# Patient Record
Sex: MALE | Race: OTHER | HISPANIC OR LATINO | URBAN - METROPOLITAN AREA
[De-identification: names, ages, dates, MRNs, and addresses within clinical notes are randomized per-mention and may not be internally consistent; named-entity substitution may affect disease eponyms.]

---

## 2021-04-21 ENCOUNTER — OFFICE VISIT (OUTPATIENT)
Dept: URBAN - METROPOLITAN AREA CLINIC 60 | Facility: CLINIC | Age: 61
End: 2021-04-21

## 2021-07-01 ENCOUNTER — OFFICE VISIT (OUTPATIENT)
Dept: URBAN - METROPOLITAN AREA SURGERY CENTER 4 | Facility: SURGERY CENTER | Age: 61
End: 2021-07-01

## 2021-07-08 LAB — PATHOLOGY (INDENTED REPORT): (no result)

## 2021-07-14 ENCOUNTER — OFFICE VISIT (OUTPATIENT)
Dept: URBAN - METROPOLITAN AREA CLINIC 60 | Facility: CLINIC | Age: 61
End: 2021-07-14

## 2022-07-09 ENCOUNTER — TELEPHONE ENCOUNTER (OUTPATIENT)
Dept: URBAN - METROPOLITAN AREA CLINIC 121 | Facility: CLINIC | Age: 62
End: 2022-07-09

## 2022-07-09 RX ORDER — EZETIMIBE 10 MG/1
TABLET ORAL
Refills: 0 | OUTPATIENT
Start: 2021-01-28 | End: 2021-07-14

## 2022-07-10 ENCOUNTER — TELEPHONE ENCOUNTER (OUTPATIENT)
Dept: URBAN - METROPOLITAN AREA CLINIC 121 | Facility: CLINIC | Age: 62
End: 2022-07-10

## 2022-07-10 RX ORDER — ROSUVASTATIN CALCIUM 20 MG/1
TABLET, FILM COATED ORAL
Refills: 0 | Status: ACTIVE | COMMUNITY
Start: 2021-04-12

## 2022-07-10 RX ORDER — METOPROLOL TARTRATE 25 MG/1
TABLET, FILM COATED ORAL
Refills: 0 | Status: ACTIVE | COMMUNITY
Start: 2021-04-12

## 2022-07-10 RX ORDER — ASPIRIN 81 MG/1
TABLET, DELAYED RELEASE ORAL
Refills: 0 | Status: ACTIVE | COMMUNITY
Start: 2021-04-21

## 2022-07-10 RX ORDER — MULTIVIT-MIN/FA/LYCOPEN/LUTEIN .4-300-25
TABLET ORAL
Refills: 0 | Status: ACTIVE | COMMUNITY
Start: 2014-05-05

## 2022-07-10 RX ORDER — FAMOTIDINE 40 MG/1
TABLET, FILM COATED ORAL
Refills: 0 | Status: ACTIVE | COMMUNITY
Start: 2021-04-19

## 2022-09-30 ENCOUNTER — OFFICE VISIT (OUTPATIENT)
Dept: URBAN - METROPOLITAN AREA CLINIC 60 | Facility: CLINIC | Age: 62
End: 2022-09-30

## 2022-10-19 ENCOUNTER — OFFICE VISIT (OUTPATIENT)
Dept: URBAN - METROPOLITAN AREA CLINIC 60 | Facility: CLINIC | Age: 62
End: 2022-10-19

## 2022-10-19 RX ORDER — METOPROLOL TARTRATE 25 MG/1
TABLET, FILM COATED ORAL
Refills: 0 | Status: ACTIVE | COMMUNITY
Start: 2021-04-12

## 2022-10-19 RX ORDER — MULTIVIT-MIN/FA/LYCOPEN/LUTEIN .4-300-25
TABLET ORAL
Refills: 0 | Status: ACTIVE | COMMUNITY
Start: 2014-05-05

## 2022-10-19 RX ORDER — ROSUVASTATIN CALCIUM 20 MG/1
TABLET, FILM COATED ORAL
Refills: 0 | Status: ACTIVE | COMMUNITY
Start: 2021-04-12

## 2022-10-19 RX ORDER — FAMOTIDINE 40 MG/1
TABLET, FILM COATED ORAL
Refills: 0 | Status: ACTIVE | COMMUNITY
Start: 2021-04-19

## 2022-10-19 RX ORDER — ASPIRIN 81 MG/1
TABLET, DELAYED RELEASE ORAL
Refills: 0 | Status: ACTIVE | COMMUNITY
Start: 2021-04-21

## 2023-01-18 ENCOUNTER — WEB ENCOUNTER (OUTPATIENT)
Dept: URBAN - METROPOLITAN AREA CLINIC 63 | Facility: CLINIC | Age: 63
End: 2023-01-18

## 2023-01-18 ENCOUNTER — OFFICE VISIT (OUTPATIENT)
Dept: URBAN - METROPOLITAN AREA CLINIC 63 | Facility: CLINIC | Age: 63
End: 2023-01-18
Payer: MEDICARE

## 2023-01-18 VITALS
OXYGEN SATURATION: 96 % | HEIGHT: 70 IN | BODY MASS INDEX: 27.06 KG/M2 | SYSTOLIC BLOOD PRESSURE: 120 MMHG | WEIGHT: 189 LBS | DIASTOLIC BLOOD PRESSURE: 70 MMHG | HEART RATE: 61 BPM | TEMPERATURE: 97 F

## 2023-01-18 DIAGNOSIS — A04.8 H. PYLORI INFECTION: ICD-10-CM

## 2023-01-18 PROBLEM — 307759003 HELICOBACTER PYLORI GASTROINTESTINAL TRACT INFECTION: Status: ACTIVE | Noted: 2023-01-18

## 2023-01-18 PROCEDURE — 99213 OFFICE O/P EST LOW 20 MIN: CPT | Performed by: INTERNAL MEDICINE

## 2023-01-18 RX ORDER — METOPROLOL TARTRATE 25 MG/1
TABLET, FILM COATED ORAL
Refills: 0 | Status: ACTIVE | COMMUNITY
Start: 2021-04-12

## 2023-01-18 RX ORDER — ASPIRIN 81 MG/1
TABLET, DELAYED RELEASE ORAL
Refills: 0 | Status: ACTIVE | COMMUNITY
Start: 2021-04-21

## 2023-01-18 RX ORDER — FAMOTIDINE 40 MG/1
TABLET, FILM COATED ORAL
Refills: 0 | Status: ACTIVE | COMMUNITY
Start: 2021-04-19

## 2023-01-18 RX ORDER — ROSUVASTATIN CALCIUM 20 MG/1
TABLET, FILM COATED ORAL
Refills: 0 | Status: ACTIVE | COMMUNITY
Start: 2021-04-12

## 2023-01-18 RX ORDER — MULTIVIT-MIN/FA/LYCOPEN/LUTEIN .4-300-25
TABLET ORAL
Refills: 0 | Status: ACTIVE | COMMUNITY
Start: 2014-05-05

## 2023-01-18 NOTE — PHYSICAL EXAM GASTROINTESTINAL
Abdomen , soft, nontender, nondistended , no guarding or rigidity , no masses palpable , normal bowel sounds , Liver and Spleen,  no hepatosplenomegaly , liver nontender , Abdomen , soft, nontender, nondistended , no guarding or rigidity , no masses palpable , normal bowel sounds , Liver and Spleen,  no hepatosplenomegaly , liver nontender. Ventral hernia.

## 2023-01-18 NOTE — HPI-TODAY'S VISIT:
4/21 Patient is here today in good general state. He is here for his screening colonoscopy. Never had colonoscopy done denies any personal history of colon rectal cancer, rectal bleeding, or change in his bowel habits. Positive for father with colorectal cancer at age of 70s.  History of MI several years ago. Plan: We will do colonoscopy if we obtain cardiac clearance. 7/21 Patient colonoscopy shows evidence of a 10 mm tubular adenoma polyp into the sigmoid colon, and internal hemorrhoids.  Today patient is in good general state he had no GI complaints.  Next colonoscopy in 3 years 1/23: Patient  with episode of reflux and epigastric pain.  went to [facility]  had  cardiac w/u that was normal and had later an EGD  with evidence  of   H. pylori.  Patient had treatment with antibiotics, will need to check status of h pylori. Will follow in 2 months. If status  persist will need another treatment and if is indicated  will consider EGD and antibiogram.

## 2023-04-24 ENCOUNTER — OFFICE VISIT (OUTPATIENT)
Dept: URBAN - METROPOLITAN AREA CLINIC 63 | Facility: CLINIC | Age: 63
End: 2023-04-24

## 2023-04-26 ENCOUNTER — TELEPHONE ENCOUNTER (OUTPATIENT)
Dept: URBAN - METROPOLITAN AREA CLINIC 64 | Facility: CLINIC | Age: 63
End: 2023-04-26

## 2023-05-09 ENCOUNTER — OFFICE VISIT (OUTPATIENT)
Dept: URBAN - METROPOLITAN AREA CLINIC 63 | Facility: CLINIC | Age: 63
End: 2023-05-09

## 2023-05-10 ENCOUNTER — OFFICE VISIT (OUTPATIENT)
Dept: URBAN - METROPOLITAN AREA CLINIC 63 | Facility: CLINIC | Age: 63
End: 2023-05-10

## 2023-05-23 ENCOUNTER — OFFICE VISIT (OUTPATIENT)
Dept: URBAN - METROPOLITAN AREA CLINIC 63 | Facility: CLINIC | Age: 63
End: 2023-05-23
Payer: MEDICARE

## 2023-05-23 VITALS
HEART RATE: 67 BPM | OXYGEN SATURATION: 96 % | HEIGHT: 70 IN | TEMPERATURE: 97.3 F | WEIGHT: 190 LBS | SYSTOLIC BLOOD PRESSURE: 130 MMHG | BODY MASS INDEX: 27.2 KG/M2 | DIASTOLIC BLOOD PRESSURE: 80 MMHG

## 2023-05-23 DIAGNOSIS — Z80.9 FAMILY HISTORY OF MALIGNANT NEOPLASM, UNSPECIFIED: ICD-10-CM

## 2023-05-23 DIAGNOSIS — Z86.010 PERSONAL HISTORY OF COLONIC POLYPS: ICD-10-CM

## 2023-05-23 DIAGNOSIS — E78.5 HYPERLIPIDEMIA, UNSPECIFIED: ICD-10-CM

## 2023-05-23 PROBLEM — 428283002: Status: ACTIVE | Noted: 2023-05-23

## 2023-05-23 PROCEDURE — 99214 OFFICE O/P EST MOD 30 MIN: CPT | Performed by: INTERNAL MEDICINE

## 2023-05-23 RX ORDER — ROSUVASTATIN CALCIUM 20 MG/1
TABLET, FILM COATED ORAL
Refills: 0 | Status: ACTIVE | COMMUNITY
Start: 2021-04-12

## 2023-05-23 RX ORDER — MULTIVIT-MIN/FA/LYCOPEN/LUTEIN .4-300-25
TABLET ORAL
Refills: 0 | Status: ACTIVE | COMMUNITY
Start: 2014-05-05

## 2023-05-23 RX ORDER — ASPIRIN 81 MG/1
TABLET, DELAYED RELEASE ORAL
Refills: 0 | Status: ACTIVE | COMMUNITY
Start: 2021-04-21

## 2023-05-23 RX ORDER — FAMOTIDINE 40 MG/1
TABLET, FILM COATED ORAL
Refills: 0 | Status: ACTIVE | COMMUNITY
Start: 2021-04-19

## 2023-05-23 RX ORDER — METOPROLOL TARTRATE 25 MG/1
TABLET, FILM COATED ORAL
Refills: 0 | Status: ACTIVE | COMMUNITY
Start: 2021-04-12

## 2023-05-23 NOTE — HPI-TODAY'S VISIT:
4/21 Patient is here today in good general state. He is here for his screening colonoscopy. Never had colonoscopy done denies any personal history of colon rectal cancer, rectal bleeding, or change in his bowel habits. Positive for father with colorectal cancer at age of 70s.  History of MI several years ago. Plan: We will do colonoscopy if we obtain cardiac clearance. 7/21 Patient colonoscopy shows evidence of a 10 mm tubular adenoma polyp into the sigmoid colon, and internal hemorrhoids.  Today patient is in good general state he had no GI complaints.  Next colonoscopy in 3 years 1/23: Patient  with episode of reflux and epigastric pain.  [place] [facility]  [place]  [place] w/u that was normal and had later an EGD  with evidence  of   H. pylori.  Patient had treatment with antibiotics, will need to check status of h pylori. Will follow in 2 months. If status  persist will need another treatment and if is indicated  will consider EGD and antibiogram. 5/23: Patient with history of H. pylori.  Patient had treatment with antibiotics that responded well to treatment, with resolution of the infection.  Patient is doing well denies any GI complaints.  Plan will be to do surveillance colonoscopy  with strong family h/o colorectal cancer his father. patient will have his colonoscopy  this year. Patient with elevated TG (250) will continue diet and treatment statrted with Dr Gutierrez. Deedee braun.

## 2023-05-30 ENCOUNTER — LAB OUTSIDE AN ENCOUNTER (OUTPATIENT)
Dept: URBAN - METROPOLITAN AREA CLINIC 63 | Facility: CLINIC | Age: 63
End: 2023-05-30

## 2023-07-10 ENCOUNTER — TELEPHONE ENCOUNTER (OUTPATIENT)
Dept: URBAN - METROPOLITAN AREA CLINIC 63 | Facility: CLINIC | Age: 63
End: 2023-07-10

## 2023-07-21 ENCOUNTER — OFFICE VISIT (OUTPATIENT)
Dept: URBAN - METROPOLITAN AREA SURGERY CENTER 4 | Facility: SURGERY CENTER | Age: 63
End: 2023-07-21

## 2023-08-14 ENCOUNTER — TELEPHONE ENCOUNTER (OUTPATIENT)
Dept: URBAN - METROPOLITAN AREA CLINIC 63 | Facility: CLINIC | Age: 63
End: 2023-08-14

## 2023-08-23 ENCOUNTER — OFFICE VISIT (OUTPATIENT)
Dept: URBAN - METROPOLITAN AREA CLINIC 63 | Facility: CLINIC | Age: 63
End: 2023-08-23

## 2023-08-28 ENCOUNTER — CLAIMS CREATED FROM THE CLAIM WINDOW (OUTPATIENT)
Dept: URBAN - METROPOLITAN AREA SURGERY CENTER 4 | Facility: SURGERY CENTER | Age: 63
End: 2023-08-28
Payer: MEDICARE

## 2023-08-28 ENCOUNTER — CLAIMS CREATED FROM THE CLAIM WINDOW (OUTPATIENT)
Dept: URBAN - METROPOLITAN AREA CLINIC 4 | Facility: CLINIC | Age: 63
End: 2023-08-28
Payer: MEDICARE

## 2023-08-28 DIAGNOSIS — Z86.010 PERSONAL HISTORY OF COLONIC POLYPS: ICD-10-CM

## 2023-08-28 DIAGNOSIS — K64.1 INTERNAL HEMORRHOIDS, GRADE II: ICD-10-CM

## 2023-08-28 DIAGNOSIS — K64.1 GRADE II HEMORRHOIDS: ICD-10-CM

## 2023-08-28 DIAGNOSIS — Z80.0 FAMILY HISTORY OF COLON CANCER: ICD-10-CM

## 2023-08-28 DIAGNOSIS — K62.1 RECTAL POLYP: ICD-10-CM

## 2023-08-28 DIAGNOSIS — K63.5 BENIGN COLONIC POLYP: ICD-10-CM

## 2023-08-28 DIAGNOSIS — D12.8 BENIGN NEOPLASM OF RECTUM: ICD-10-CM

## 2023-08-28 PROCEDURE — 45380 COLONOSCOPY AND BIOPSY: CPT | Performed by: INTERNAL MEDICINE

## 2023-08-28 PROCEDURE — 00811 ANES LWR INTST NDSC NOS: CPT | Performed by: NURSE ANESTHETIST, CERTIFIED REGISTERED

## 2023-08-28 PROCEDURE — 88305 TISSUE EXAM BY PATHOLOGIST: CPT | Performed by: PATHOLOGY

## 2023-08-28 RX ORDER — ASPIRIN 81 MG/1
TABLET, DELAYED RELEASE ORAL
Refills: 0 | Status: ACTIVE | COMMUNITY
Start: 2021-04-21

## 2023-08-28 RX ORDER — MULTIVIT-MIN/FA/LYCOPEN/LUTEIN .4-300-25
TABLET ORAL
Refills: 0 | Status: ACTIVE | COMMUNITY
Start: 2014-05-05

## 2023-08-28 RX ORDER — ROSUVASTATIN CALCIUM 20 MG/1
TABLET, FILM COATED ORAL
Refills: 0 | Status: ACTIVE | COMMUNITY
Start: 2021-04-12

## 2023-08-28 RX ORDER — FAMOTIDINE 40 MG/1
TABLET, FILM COATED ORAL
Refills: 0 | Status: ACTIVE | COMMUNITY
Start: 2021-04-19

## 2023-08-28 RX ORDER — METOPROLOL TARTRATE 25 MG/1
TABLET, FILM COATED ORAL
Refills: 0 | Status: ACTIVE | COMMUNITY
Start: 2021-04-12

## 2023-10-10 ENCOUNTER — OFFICE VISIT (OUTPATIENT)
Dept: URBAN - METROPOLITAN AREA CLINIC 63 | Facility: CLINIC | Age: 63
End: 2023-10-10
Payer: MEDICARE

## 2023-10-10 ENCOUNTER — DASHBOARD ENCOUNTERS (OUTPATIENT)
Age: 63
End: 2023-10-10

## 2023-10-10 VITALS
HEART RATE: 73 BPM | SYSTOLIC BLOOD PRESSURE: 132 MMHG | TEMPERATURE: 97.5 F | HEIGHT: 70 IN | OXYGEN SATURATION: 98 % | DIASTOLIC BLOOD PRESSURE: 80 MMHG | BODY MASS INDEX: 27.06 KG/M2 | WEIGHT: 189 LBS

## 2023-10-10 DIAGNOSIS — Z86.010 PERSONAL HISTORY OF COLONIC POLYPS: ICD-10-CM

## 2023-10-10 DIAGNOSIS — Z80.9 FAMILY HISTORY OF MALIGNANT NEOPLASM, UNSPECIFIED: ICD-10-CM

## 2023-10-10 DIAGNOSIS — K64.8 INTERNAL HEMORRHOIDS: ICD-10-CM

## 2023-10-10 PROCEDURE — 99214 OFFICE O/P EST MOD 30 MIN: CPT | Performed by: INTERNAL MEDICINE

## 2023-10-10 RX ORDER — ROSUVASTATIN CALCIUM 20 MG/1
TABLET, FILM COATED ORAL
Refills: 0 | Status: ACTIVE | COMMUNITY
Start: 2021-04-12

## 2023-10-10 RX ORDER — HYDROCORTISONE 25 MG/G
1 APPLICATION CREAM TOPICAL TWICE A DAY
Qty: 60 | Refills: 1 | OUTPATIENT
Start: 2023-10-10 | End: 2023-12-08

## 2023-10-10 RX ORDER — ASPIRIN 81 MG/1
TABLET, DELAYED RELEASE ORAL
Refills: 0 | Status: ACTIVE | COMMUNITY
Start: 2021-04-21

## 2023-10-10 RX ORDER — METOPROLOL TARTRATE 25 MG/1
TABLET, FILM COATED ORAL
Refills: 0 | Status: ACTIVE | COMMUNITY
Start: 2021-04-12

## 2023-10-10 RX ORDER — MULTIVIT-MIN/FA/LYCOPEN/LUTEIN .4-300-25
TABLET ORAL
Refills: 0 | Status: ACTIVE | COMMUNITY
Start: 2014-05-05

## 2023-10-10 RX ORDER — FAMOTIDINE 40 MG/1
TABLET, FILM COATED ORAL
Refills: 0 | Status: ACTIVE | COMMUNITY
Start: 2021-04-19

## 2023-10-10 NOTE — HPI-TODAY'S VISIT:
4/21 Patient is here today in good general state. He is here for his screening colonoscopy. Never had colonoscopy done denies any personal history of colon rectal cancer, rectal bleeding, or change in his bowel habits. Positive for father with colorectal cancer at age of 70s.  History of MI several years ago. Plan: We will do colonoscopy if we obtain cardiac clearance. 7/21 Patient colonoscopy shows evidence of a 10 mm tubular adenoma polyp into the sigmoid colon, and internal hemorrhoids.  Today patient is in good general state he had no GI complaints.  Next colonoscopy in 3 years 1/23: Patient  with episode of reflux and epigastric pain.  [place] [facility]  [place]  cardiac w/u that was normal and had later an EGD  with evidence  of   H. pylori.  Patient had treatment with antibiotics, will need to check status of h pylori. Will follow in 2 months. If status  persist will need another treatment and if is indicated  will consider EGD and antibiogram. 5/23: Patient with history of H. pylori.  Patient had treatment with antibiotics that responded well to treatment, with resolution of the infection.  Patient is doing well denies any GI complaints.  Plan will be to do surveillance colonoscopy  with strong family h/o colorectal cancer his father. patient will have his colonoscopy  this year. Patient with elevated TG (250) will continue diet and treatment statrted with Dr Gutierrez. Deedee follow [place]. 10/23: Patient had a colonoscopy 28 August of 2023 with evidence of a 6 mm polyp in the rectum removed with cold snare, and internal hemorrhoids.  Pathology report showed to be a tubular adenoma, will plan to do a colonoscopy in 5 years as per protocol. Patient with rare irritation of hemorrhoids will do topical treatmant.

## 2025-04-14 ENCOUNTER — OFFICE VISIT (OUTPATIENT)
Dept: URBAN - METROPOLITAN AREA CLINIC 63 | Facility: CLINIC | Age: 65
End: 2025-04-14
Payer: COMMERCIAL

## 2025-04-14 ENCOUNTER — TELEPHONE ENCOUNTER (OUTPATIENT)
Dept: URBAN - METROPOLITAN AREA CLINIC 63 | Facility: CLINIC | Age: 65
End: 2025-04-14

## 2025-04-14 DIAGNOSIS — Z86.0100 HISTORY OF COLON POLYPS: ICD-10-CM

## 2025-04-14 DIAGNOSIS — K64.8 INTERNAL HEMORRHOIDS: ICD-10-CM

## 2025-04-14 DIAGNOSIS — Z80.9 FAMILY HISTORY OF MALIGNANT NEOPLASM, UNSPECIFIED: ICD-10-CM

## 2025-04-14 PROCEDURE — 99214 OFFICE O/P EST MOD 30 MIN: CPT | Performed by: INTERNAL MEDICINE

## 2025-04-14 RX ORDER — ROSUVASTATIN CALCIUM 40 MG/1
TABLET, FILM COATED ORAL
Qty: 90 TABLET | Status: ACTIVE | COMMUNITY

## 2025-04-14 RX ORDER — FAMOTIDINE 40 MG/1
TABLET ORAL
Qty: 90 TABLET | Status: ACTIVE | COMMUNITY

## 2025-04-14 RX ORDER — METOPROLOL TARTRATE 25 MG/1
TOMAR 1 TABLETA POR VIA ORAL 2 VECES AL DIA TABLET, FILM COATED ORAL
Qty: 60 EACH | Refills: 0 | Status: ACTIVE | COMMUNITY

## 2025-04-14 NOTE — HPI-TODAY'S VISIT:
4/21 Patient is here today in good general state. He is here for his screening colonoscopy. Never had colonoscopy done denies any personal history of colon rectal cancer, rectal bleeding, or change in his bowel habits. Positive for father with colorectal cancer at age of 70s.  History of MI several years ago. Plan: We will do colonoscopy if we obtain cardiac clearance. 7/21 Patient colonoscopy shows evidence of a 10 mm tubular adenoma polyp into the sigmoid colon, and internal hemorrhoids.  Today patient is in good general state he had no GI complaints.  Next colonoscopy in 3 years 1/23: Patient  with episode of reflux and epigastric pain.  went to [facility] [place]  had  cardiac w/u that was normal and had later an EGD  with evidence  of   H. pylori.  Patient had treatment with antibiotics, will need to check status of h pylori. Will follow in 2 months. If status  persist will need another treatment and if is indicated  will consider EGD and antibiogram. 5/23: Patient with history of H. pylori.  Patient had treatment with antibiotics that responded well to treatment, with resolution of the infection.  Patient is doing well denies any GI complaints.  Plan will be to do surveillance colonoscopy  with strong family h/o colorectal cancer his father. patient will have his colonoscopy  this year. Patient with elevated TG (250) will continue diet and treatment statrted with Dr Gutierrez. Deedee follow [place]. 10/23: Patient had a colonoscopy 28 August of 2023 with evidence of a 6 mm polyp in the rectum removed with cold snare, and internal hemorrhoids.  Pathology report showed to be a tubular adenoma, will plan to do a colonoscopy in 5 years as per protocol. Patient with rare irritation of hemorrhoids will do topical treatment. 4/25: Patient with internal hemorrhoids, with worsening with some diet and with streining. takes  topical treatment and resolved in one to two days. Will continue topical treatment prn. Patient with family h/o colon cancer and with personal h/o colon polyps will repeat colonoscopy next year.

## 2025-07-02 ENCOUNTER — APPOINTMENT (OUTPATIENT)
Dept: URBAN - METROPOLITAN AREA CLINIC 148 | Facility: CLINIC | Age: 65
Setting detail: DERMATOLOGY
End: 2025-07-02

## 2025-07-02 DIAGNOSIS — D22 MELANOCYTIC NEVI: ICD-10-CM

## 2025-07-02 DIAGNOSIS — D18.0 HEMANGIOMA: ICD-10-CM

## 2025-07-02 DIAGNOSIS — L81.4 OTHER MELANIN HYPERPIGMENTATION: ICD-10-CM

## 2025-07-02 DIAGNOSIS — L82.1 OTHER SEBORRHEIC KERATOSIS: ICD-10-CM

## 2025-07-02 PROBLEM — D48.5 NEOPLASM OF UNCERTAIN BEHAVIOR OF SKIN: Status: ACTIVE | Noted: 2025-07-02

## 2025-07-02 PROBLEM — D22.5 MELANOCYTIC NEVI OF TRUNK: Status: ACTIVE | Noted: 2025-07-02

## 2025-07-02 PROBLEM — D18.01 HEMANGIOMA OF SKIN AND SUBCUTANEOUS TISSUE: Status: ACTIVE | Noted: 2025-07-02

## 2025-07-02 PROCEDURE — ? TREATMENT REGIMEN

## 2025-07-02 PROCEDURE — ? COUNSELING

## 2025-07-02 PROCEDURE — ? BIOPSY BY SHAVE METHOD

## 2025-07-02 PROCEDURE — ? FULL BODY SKIN EXAM

## 2025-07-02 ASSESSMENT — LOCATION DETAILED DESCRIPTION DERM
LOCATION DETAILED: RIGHT DISTAL DORSAL FOREARM
LOCATION DETAILED: RIGHT RADIAL DORSAL HAND
LOCATION DETAILED: PERIUMBILICAL SKIN
LOCATION DETAILED: LEFT DISTAL DORSAL FOREARM
LOCATION DETAILED: LEFT INFERIOR MEDIAL UPPER BACK
LOCATION DETAILED: RIGHT SUPERIOR UPPER BACK
LOCATION DETAILED: LEFT CENTRAL MALAR CHEEK
LOCATION DETAILED: LEFT ULNAR DORSAL HAND
LOCATION DETAILED: LEFT PROXIMAL PRETIBIAL REGION
LOCATION DETAILED: LEFT SUPERIOR UPPER BACK
LOCATION DETAILED: RIGHT PROXIMAL PRETIBIAL REGION
LOCATION DETAILED: LEFT MEDIAL UPPER BACK

## 2025-07-02 ASSESSMENT — LOCATION ZONE DERM
LOCATION ZONE: FACE
LOCATION ZONE: HAND
LOCATION ZONE: LEG
LOCATION ZONE: TRUNK
LOCATION ZONE: ARM

## 2025-07-02 ASSESSMENT — LOCATION SIMPLE DESCRIPTION DERM
LOCATION SIMPLE: LEFT FOREARM
LOCATION SIMPLE: RIGHT HAND
LOCATION SIMPLE: RIGHT FOREARM
LOCATION SIMPLE: ABDOMEN
LOCATION SIMPLE: LEFT PRETIBIAL REGION
LOCATION SIMPLE: LEFT CHEEK
LOCATION SIMPLE: RIGHT PRETIBIAL REGION
LOCATION SIMPLE: RIGHT UPPER BACK
LOCATION SIMPLE: LEFT HAND
LOCATION SIMPLE: LEFT UPPER BACK

## 2025-07-02 NOTE — PROCEDURE: TREATMENT REGIMEN
Detail Level: Zone
Initiate Treatment: Elta MD clear sunscreen QAM
Otc Regimen: Use physical/ mineral sunscreen daily spf +30. Re-apply every two hours if necessary

## 2025-07-14 ENCOUNTER — APPOINTMENT (OUTPATIENT)
Dept: URBAN - METROPOLITAN AREA CLINIC 148 | Facility: CLINIC | Age: 65
Setting detail: DERMATOLOGY
End: 2025-07-14

## 2025-07-14 DIAGNOSIS — D485 NEOPLASM OF UNCERTAIN BEHAVIOR OF SKIN: ICD-10-CM

## 2025-07-14 DIAGNOSIS — D22 MELANOCYTIC NEVI: ICD-10-CM

## 2025-07-14 PROBLEM — D48.5 NEOPLASM OF UNCERTAIN BEHAVIOR OF SKIN: Status: ACTIVE | Noted: 2025-07-14

## 2025-07-14 PROBLEM — D22.5 MELANOCYTIC NEVI OF TRUNK: Status: ACTIVE | Noted: 2025-07-14

## 2025-07-14 PROCEDURE — ? DEFER

## 2025-07-14 PROCEDURE — ? EXCISION

## 2025-07-14 ASSESSMENT — LOCATION ZONE DERM
LOCATION ZONE: EYELID
LOCATION ZONE: TRUNK

## 2025-07-14 ASSESSMENT — LOCATION DETAILED DESCRIPTION DERM
LOCATION DETAILED: LEFT SUPERIOR LID MARGIN
LOCATION DETAILED: LEFT SUPERIOR UPPER BACK

## 2025-07-14 ASSESSMENT — LOCATION SIMPLE DESCRIPTION DERM
LOCATION SIMPLE: LEFT UPPER BACK
LOCATION SIMPLE: LEFT SUPERIOR EYELID

## 2025-07-14 NOTE — PROCEDURE: EXCISION

## 2025-07-14 NOTE — PROCEDURE: DEFER
Detail Level: Zone
Introduction Text (Please End With A Colon): The following procedure was deferred:
Size Of Lesion In Cm (Optional): 0
Instructions (Optional): Refer to Dr. Lavelle Singh Oculoplastic Surgeon, patient provided contact information to call and schedule today.
Other Procedure: biopsy

## 2025-07-30 ENCOUNTER — APPOINTMENT (OUTPATIENT)
Dept: URBAN - METROPOLITAN AREA CLINIC 148 | Facility: CLINIC | Age: 65
Setting detail: DERMATOLOGY
End: 2025-07-30

## 2025-07-30 DIAGNOSIS — Z48.02 ENCOUNTER FOR REMOVAL OF SUTURES: ICD-10-CM

## 2025-07-30 PROCEDURE — ? SUTURE REMOVAL (GLOBAL PERIOD)

## 2025-07-30 ASSESSMENT — LOCATION SIMPLE DESCRIPTION DERM: LOCATION SIMPLE: LEFT UPPER BACK

## 2025-07-30 ASSESSMENT — LOCATION ZONE DERM: LOCATION ZONE: TRUNK

## 2025-07-30 ASSESSMENT — LOCATION DETAILED DESCRIPTION DERM: LOCATION DETAILED: LEFT SUPERIOR UPPER BACK

## 2025-07-30 NOTE — PROCEDURE: SUTURE REMOVAL (GLOBAL PERIOD)
Detail Level: Detailed
Add 69001 Cpt? (Important Note: In 2017 The Use Of 94477 Is Being Tracked By Cms To Determine Future Global Period Reimbursement For Global Periods): yes

## 2025-08-11 ENCOUNTER — OFFICE VISIT (OUTPATIENT)
Dept: URBAN - METROPOLITAN AREA CLINIC 63 | Facility: CLINIC | Age: 65
End: 2025-08-11
Payer: COMMERCIAL

## 2025-08-11 ENCOUNTER — LAB OUTSIDE AN ENCOUNTER (OUTPATIENT)
Dept: URBAN - METROPOLITAN AREA CLINIC 63 | Facility: CLINIC | Age: 65
End: 2025-08-11

## 2025-08-11 ENCOUNTER — TELEPHONE ENCOUNTER (OUTPATIENT)
Dept: URBAN - METROPOLITAN AREA CLINIC 63 | Facility: CLINIC | Age: 65
End: 2025-08-11

## 2025-08-11 ENCOUNTER — TELEPHONE ENCOUNTER (OUTPATIENT)
Dept: URBAN - METROPOLITAN AREA CLINIC 60 | Facility: CLINIC | Age: 65
End: 2025-08-11

## 2025-08-11 DIAGNOSIS — K21.9 CHRONIC GERD: ICD-10-CM

## 2025-08-11 DIAGNOSIS — Z80.9 FAMILY HISTORY OF MALIGNANT NEOPLASM, UNSPECIFIED: ICD-10-CM

## 2025-08-11 DIAGNOSIS — D12.6 ADENOMA DETERMINED BY COLORECTAL BIOPSY: ICD-10-CM

## 2025-08-11 PROCEDURE — 99214 OFFICE O/P EST MOD 30 MIN: CPT | Performed by: INTERNAL MEDICINE

## 2025-08-11 RX ORDER — METOPROLOL TARTRATE 25 MG/1
TOMAR 1 TABLETA POR VIA ORAL 2 VECES AL DIA TABLET, FILM COATED ORAL
Qty: 60 EACH | Refills: 0 | Status: ACTIVE | COMMUNITY

## 2025-08-11 RX ORDER — POLYETHYLENE GLYCOL 3350, SODIUM SULFATE, SODIUM CHLORIDE, POTASSIUM CHLORIDE, ASCORBIC ACID, SODIUM ASCORBATE 140-9-5.2G
177ML KIT ORAL ONCE
Qty: 177 MILLILITER | Refills: 0 | OUTPATIENT
Start: 2025-08-11 | End: 2025-08-12

## 2025-08-11 RX ORDER — FAMOTIDINE 40 MG/1
TABLET ORAL
Qty: 90 TABLET | Status: ACTIVE | COMMUNITY

## 2025-08-11 RX ORDER — FAMOTIDINE 20 MG/1
1 TABLET AT BEDTIME AS NEEDED TABLET, FILM COATED ORAL ONCE A DAY
Qty: 30 | OUTPATIENT
Start: 2025-08-11

## 2025-08-11 RX ORDER — ROSUVASTATIN CALCIUM 40 MG/1
TABLET, FILM COATED ORAL
Qty: 90 TABLET | Status: ACTIVE | COMMUNITY

## 2025-08-11 RX ORDER — POLYETHYLENE GLYCOL 3350, SODIUM SULFATE, SODIUM CHLORIDE, POTASSIUM CHLORIDE, ASCORBIC ACID, SODIUM ASCORBATE 140-9-5.2G
AS DIRECTED KIT ORAL
OUTPATIENT
Start: 2025-08-11

## 2025-08-15 ENCOUNTER — TELEPHONE ENCOUNTER (OUTPATIENT)
Dept: URBAN - METROPOLITAN AREA CLINIC 59 | Facility: CLINIC | Age: 65
End: 2025-08-15

## 2025-08-15 RX ORDER — POLYETHYLENE GLYCOL 3350, SODIUM SULFATE, POTASSIUM CHLORIDE, MAGNESIUM SULFATE, AND SODIUM CHLORIDE FOR ORAL SOLUTION 178.7-7.3G
2000ML KIT ORAL ONCE
Qty: 1 UNSPECIFIED | Refills: 0 | OUTPATIENT
Start: 2025-08-15 | End: 2025-08-16